# Patient Record
Sex: MALE | Race: BLACK OR AFRICAN AMERICAN | Employment: FULL TIME | URBAN - METROPOLITAN AREA
[De-identification: names, ages, dates, MRNs, and addresses within clinical notes are randomized per-mention and may not be internally consistent; named-entity substitution may affect disease eponyms.]

---

## 2020-02-09 ENCOUNTER — HOSPITAL ENCOUNTER (INPATIENT)
Age: 50
LOS: 1 days | Discharge: HOME | DRG: 101 | End: 2020-02-10
Attending: EMERGENCY MEDICINE | Admitting: FAMILY MEDICINE
Payer: COMMERCIAL

## 2020-02-09 ENCOUNTER — APPOINTMENT (OUTPATIENT)
Dept: GENERAL RADIOLOGY | Age: 50
DRG: 101 | End: 2020-02-09
Payer: COMMERCIAL

## 2020-02-09 ENCOUNTER — APPOINTMENT (OUTPATIENT)
Dept: CT IMAGING | Age: 50
DRG: 101 | End: 2020-02-09
Payer: COMMERCIAL

## 2020-02-09 PROBLEM — R56.9 SEIZURE (HCC): Status: ACTIVE | Noted: 2020-02-09

## 2020-02-09 LAB
ANION GAP SERPL CALCULATED.3IONS-SCNC: 23 MMOL/L (ref 3–16)
BASOPHILS ABSOLUTE: 0 K/UL (ref 0–0.2)
BASOPHILS RELATIVE PERCENT: 0.5 %
BILIRUBIN URINE: NEGATIVE
BLOOD, URINE: ABNORMAL
BUN BLDV-MCNC: 14 MG/DL (ref 7–20)
CALCIUM SERPL-MCNC: 9.4 MG/DL (ref 8.3–10.6)
CHLORIDE BLD-SCNC: 100 MMOL/L (ref 99–110)
CLARITY: CLEAR
CO2: 17 MMOL/L (ref 21–32)
COLOR: YELLOW
CREAT SERPL-MCNC: 1.1 MG/DL (ref 0.9–1.3)
EKG ATRIAL RATE: 140 BPM
EKG DIAGNOSIS: NORMAL
EKG P AXIS: 62 DEGREES
EKG P-R INTERVAL: 124 MS
EKG Q-T INTERVAL: 278 MS
EKG QRS DURATION: 76 MS
EKG QTC CALCULATION (BAZETT): 424 MS
EKG R AXIS: 79 DEGREES
EKG T AXIS: 23 DEGREES
EKG VENTRICULAR RATE: 140 BPM
EOSINOPHILS ABSOLUTE: 0.2 K/UL (ref 0–0.6)
EOSINOPHILS RELATIVE PERCENT: 1.7 %
EPITHELIAL CELLS, UA: 2 /HPF (ref 0–5)
GFR AFRICAN AMERICAN: >60
GFR NON-AFRICAN AMERICAN: >60
GLUCOSE BLD-MCNC: 107 MG/DL (ref 70–99)
GLUCOSE URINE: NEGATIVE MG/DL
HCT VFR BLD CALC: 45.4 % (ref 40.5–52.5)
HEMOGLOBIN: 15.5 G/DL (ref 13.5–17.5)
HYALINE CASTS: 1 /LPF (ref 0–8)
KETONES, URINE: NEGATIVE MG/DL
LACTIC ACID: 1 MMOL/L (ref 0.4–2)
LACTIC ACID: 2.8 MMOL/L (ref 0.4–2)
LACTIC ACID: 9.2 MMOL/L (ref 0.4–2)
LEUKOCYTE ESTERASE, URINE: NEGATIVE
LYMPHOCYTES ABSOLUTE: 1.5 K/UL (ref 1–5.1)
LYMPHOCYTES RELATIVE PERCENT: 15.1 %
MCH RBC QN AUTO: 29.3 PG (ref 26–34)
MCHC RBC AUTO-ENTMCNC: 34.3 G/DL (ref 31–36)
MCV RBC AUTO: 85.6 FL (ref 80–100)
MICROSCOPIC EXAMINATION: YES
MONOCYTES ABSOLUTE: 0.9 K/UL (ref 0–1.3)
MONOCYTES RELATIVE PERCENT: 9.4 %
NEUTROPHILS ABSOLUTE: 7.2 K/UL (ref 1.7–7.7)
NEUTROPHILS RELATIVE PERCENT: 73.3 %
NITRITE, URINE: NEGATIVE
PDW BLD-RTO: 14.5 % (ref 12.4–15.4)
PH UA: 5 (ref 5–8)
PHENYTOIN DOSE AMOUNT: ABNORMAL
PHENYTOIN LEVEL: 3.5 UG/ML (ref 10–20)
PLATELET # BLD: 297 K/UL (ref 135–450)
PMV BLD AUTO: 6.8 FL (ref 5–10.5)
POTASSIUM SERPL-SCNC: 4.3 MMOL/L (ref 3.5–5.1)
PRO-BNP: 10 PG/ML (ref 0–124)
PROCALCITONIN: 0.1 NG/ML (ref 0–0.15)
PROTEIN UA: 30 MG/DL
RBC # BLD: 5.3 M/UL (ref 4.2–5.9)
RBC UA: 0 /HPF (ref 0–4)
SODIUM BLD-SCNC: 140 MMOL/L (ref 136–145)
SPECIFIC GRAVITY UA: 1.01 (ref 1–1.03)
TOTAL CK: 161 U/L (ref 39–308)
TROPONIN: <0.01 NG/ML
URINE REFLEX TO CULTURE: ABNORMAL
URINE TYPE: ABNORMAL
UROBILINOGEN, URINE: 0.2 E.U./DL
WBC # BLD: 9.8 K/UL (ref 4–11)
WBC UA: 1 /HPF (ref 0–5)

## 2020-02-09 PROCEDURE — 84145 PROCALCITONIN (PCT): CPT

## 2020-02-09 PROCEDURE — 6370000000 HC RX 637 (ALT 250 FOR IP): Performed by: PHYSICIAN ASSISTANT

## 2020-02-09 PROCEDURE — 82550 ASSAY OF CK (CPK): CPT

## 2020-02-09 PROCEDURE — 36415 COLL VENOUS BLD VENIPUNCTURE: CPT

## 2020-02-09 PROCEDURE — 6360000004 HC RX CONTRAST MEDICATION: Performed by: EMERGENCY MEDICINE

## 2020-02-09 PROCEDURE — 71045 X-RAY EXAM CHEST 1 VIEW: CPT

## 2020-02-09 PROCEDURE — 83880 ASSAY OF NATRIURETIC PEPTIDE: CPT

## 2020-02-09 PROCEDURE — 80048 BASIC METABOLIC PNL TOTAL CA: CPT

## 2020-02-09 PROCEDURE — 6370000000 HC RX 637 (ALT 250 FOR IP): Performed by: FAMILY MEDICINE

## 2020-02-09 PROCEDURE — 80185 ASSAY OF PHENYTOIN TOTAL: CPT

## 2020-02-09 PROCEDURE — 85025 COMPLETE CBC W/AUTO DIFF WBC: CPT

## 2020-02-09 PROCEDURE — 83605 ASSAY OF LACTIC ACID: CPT

## 2020-02-09 PROCEDURE — 81001 URINALYSIS AUTO W/SCOPE: CPT

## 2020-02-09 PROCEDURE — 87040 BLOOD CULTURE FOR BACTERIA: CPT

## 2020-02-09 PROCEDURE — 99285 EMERGENCY DEPT VISIT HI MDM: CPT

## 2020-02-09 PROCEDURE — 94760 N-INVAS EAR/PLS OXIMETRY 1: CPT

## 2020-02-09 PROCEDURE — 96365 THER/PROPH/DIAG IV INF INIT: CPT

## 2020-02-09 PROCEDURE — 93005 ELECTROCARDIOGRAM TRACING: CPT | Performed by: PHYSICIAN ASSISTANT

## 2020-02-09 PROCEDURE — 71260 CT THORAX DX C+: CPT

## 2020-02-09 PROCEDURE — 2580000003 HC RX 258: Performed by: PHYSICIAN ASSISTANT

## 2020-02-09 PROCEDURE — 93010 ELECTROCARDIOGRAM REPORT: CPT | Performed by: INTERNAL MEDICINE

## 2020-02-09 PROCEDURE — 1200000000 HC SEMI PRIVATE

## 2020-02-09 PROCEDURE — 6360000002 HC RX W HCPCS: Performed by: PHYSICIAN ASSISTANT

## 2020-02-09 PROCEDURE — 96361 HYDRATE IV INFUSION ADD-ON: CPT

## 2020-02-09 PROCEDURE — 96367 TX/PROPH/DG ADDL SEQ IV INF: CPT

## 2020-02-09 PROCEDURE — 84484 ASSAY OF TROPONIN QUANT: CPT

## 2020-02-09 RX ORDER — PHENYTOIN SODIUM 100 MG/1
500 CAPSULE, EXTENDED RELEASE ORAL DAILY
Status: DISCONTINUED | OUTPATIENT
Start: 2020-02-09 | End: 2020-02-09

## 2020-02-09 RX ORDER — GUAIFENESIN 600 MG/1
600 TABLET, EXTENDED RELEASE ORAL 2 TIMES DAILY
Status: DISCONTINUED | OUTPATIENT
Start: 2020-02-09 | End: 2020-02-10 | Stop reason: HOSPADM

## 2020-02-09 RX ORDER — AMLODIPINE BESYLATE 5 MG/1
5 TABLET ORAL NIGHTLY
Status: DISCONTINUED | OUTPATIENT
Start: 2020-02-09 | End: 2020-02-10 | Stop reason: HOSPADM

## 2020-02-09 RX ORDER — PHENYTOIN SODIUM 100 MG/1
200 CAPSULE, EXTENDED RELEASE ORAL NIGHTLY
Status: DISCONTINUED | OUTPATIENT
Start: 2020-02-09 | End: 2020-02-10 | Stop reason: HOSPADM

## 2020-02-09 RX ORDER — IBUPROFEN 600 MG/1
600 TABLET ORAL EVERY 6 HOURS PRN
Status: DISCONTINUED | OUTPATIENT
Start: 2020-02-09 | End: 2020-02-10 | Stop reason: HOSPADM

## 2020-02-09 RX ORDER — PHENYTOIN SODIUM 100 MG/1
300 CAPSULE, EXTENDED RELEASE ORAL EVERY MORNING
Status: DISCONTINUED | OUTPATIENT
Start: 2020-02-10 | End: 2020-02-09

## 2020-02-09 RX ORDER — LORAZEPAM 2 MG/ML
2 INJECTION INTRAMUSCULAR EVERY 4 HOURS PRN
Status: DISCONTINUED | OUTPATIENT
Start: 2020-02-09 | End: 2020-02-10 | Stop reason: HOSPADM

## 2020-02-09 RX ORDER — ATORVASTATIN CALCIUM 20 MG/1
20 TABLET, FILM COATED ORAL NIGHTLY
Status: DISCONTINUED | OUTPATIENT
Start: 2020-02-09 | End: 2020-02-10 | Stop reason: HOSPADM

## 2020-02-09 RX ORDER — PHENYTOIN SODIUM 100 MG/1
100 CAPSULE, EXTENDED RELEASE ORAL ONCE
Status: COMPLETED | OUTPATIENT
Start: 2020-02-09 | End: 2020-02-09

## 2020-02-09 RX ORDER — PHENYTOIN SODIUM 100 MG/1
300 CAPSULE, EXTENDED RELEASE ORAL
Status: DISCONTINUED | OUTPATIENT
Start: 2020-02-10 | End: 2020-02-10 | Stop reason: HOSPADM

## 2020-02-09 RX ORDER — BENZONATATE 100 MG/1
100 CAPSULE ORAL 3 TIMES DAILY PRN
Status: DISCONTINUED | OUTPATIENT
Start: 2020-02-09 | End: 2020-02-10 | Stop reason: HOSPADM

## 2020-02-09 RX ORDER — AMLODIPINE BESYLATE 5 MG/1
5 TABLET ORAL DAILY
Status: DISCONTINUED | OUTPATIENT
Start: 2020-02-09 | End: 2020-02-09

## 2020-02-09 RX ORDER — 0.9 % SODIUM CHLORIDE 0.9 %
30 INTRAVENOUS SOLUTION INTRAVENOUS ONCE
Status: COMPLETED | OUTPATIENT
Start: 2020-02-09 | End: 2020-02-09

## 2020-02-09 RX ADMIN — PHENYTOIN SODIUM 100 MG: 100 CAPSULE ORAL at 12:19

## 2020-02-09 RX ADMIN — AMLODIPINE BESYLATE 5 MG: 5 TABLET ORAL at 20:34

## 2020-02-09 RX ADMIN — PHENYTOIN SODIUM 200 MG: 100 CAPSULE ORAL at 20:34

## 2020-02-09 RX ADMIN — GUAIFENESIN 600 MG: 600 TABLET ORAL at 20:34

## 2020-02-09 RX ADMIN — CEFTRIAXONE 1 G: 1 INJECTION, POWDER, FOR SOLUTION INTRAMUSCULAR; INTRAVENOUS at 12:15

## 2020-02-09 RX ADMIN — ATORVASTATIN CALCIUM 20 MG: 20 TABLET, FILM COATED ORAL at 20:34

## 2020-02-09 RX ADMIN — SODIUM CHLORIDE 2733 ML: 9 INJECTION, SOLUTION INTRAVENOUS at 10:58

## 2020-02-09 RX ADMIN — IOPAMIDOL 75 ML: 755 INJECTION, SOLUTION INTRAVENOUS at 11:49

## 2020-02-09 RX ADMIN — AZITHROMYCIN MONOHYDRATE 500 MG: 500 INJECTION, POWDER, LYOPHILIZED, FOR SOLUTION INTRAVENOUS at 12:47

## 2020-02-09 ASSESSMENT — PAIN SCALES - GENERAL
PAINLEVEL_OUTOF10: 0

## 2020-02-09 ASSESSMENT — ENCOUNTER SYMPTOMS
SORE THROAT: 0
BACK PAIN: 0
VOMITING: 0
NAUSEA: 0
ABDOMINAL PAIN: 0
SHORTNESS OF BREATH: 0
COUGH: 1

## 2020-02-09 NOTE — ED NOTES
Report called to  Edin Hedrick RN   To Room   4257   Cardiac monitor on during transfer  Pt's pain level   Denies   VSS, Afebrile   IV site is clean, dry and intact, Normal saline infusing   Family updated on transfer            Miguel Guzmán RN  02/09/20 2911

## 2020-02-09 NOTE — ED PROVIDER NOTES
Review of Systems   Constitutional: Negative for chills and fever. HENT: Negative for sore throat. Respiratory: Positive for cough. Negative for shortness of breath. Cardiovascular: Negative for chest pain, palpitations and leg swelling. Gastrointestinal: Negative for abdominal pain, nausea and vomiting. Genitourinary: Negative for difficulty urinating and dysuria. Musculoskeletal: Negative for back pain. Skin: Negative for rash. Neurological: Positive for seizures (per girlfriend). Negative for light-headedness and headaches. Psychiatric/Behavioral: The patient is not nervous/anxious. All other systems reviewed and are negative. Except as noted above the remainder ofthe review of systems was reviewed and negative. PAST MEDICALHISTORY         Diagnosis Date    Hyperlipidemia     Seizures ([facility] [address]        SURGICAL HISTORY     History reviewed. No pertinent surgical history. CURRENT MEDICATIONS       Previous Medications    ATORVASTATIN (LIPITOR) 20 MG TABLET    Take 20 mg by mouth daily    IBUPROFEN (ADVIL;MOTRIN) 600 MG TABLET    Take 1 tablet by mouth every 6 hours as needed for Pain    PHENYTOIN (DILANTIN) 100 MG ER CAPSULE    Take 500 mg by mouth daily        ALLERGIES     Patient has no known allergies. FAMILY HISTORY     History reviewed. No pertinent family history. No family status information on file. SOCIAL HISTORY    reports that he has never smoked. He has never used smokeless tobacco. He reports current alcohol use. He reports that he does not use drugs. PHYSICAL EXAM    (up to 7 for level 4, 8 or more for level 5)     ED Triage Vitals [02/09/20 1032]   Enc Vitals Group      BP (!) 156/83      Pulse 151      Resp 15      Temp 98.4 °F (36.9 °C)      Temp Source Oral      SpO2 97 %      Weight 200 lb 13.4 oz (91.1 kg)      Height 6' (1.829 m)     Physical Exam  Vitals signs and nursing note reviewed.    Constitutional:       General: He is not in acute distress. Appearance: He is well-developed. HENT:      Head: Normocephalic and atraumatic. Mouth/Throat:      Mouth: Mucous membranes are moist.   Eyes:      Pupils: Pupils are equal, round, and reactive to light. Neck:      Musculoskeletal: Neck supple. Cardiovascular:      Rate and Rhythm: Regular rhythm. Tachycardia present. Heart sounds: Normal heart sounds. Pulmonary:      Effort: Pulmonary effort is normal. No respiratory distress. Breath sounds: Normal breath sounds. Abdominal:      General: Abdomen is flat. Tenderness: There is no abdominal tenderness. Musculoskeletal: Normal range of motion. Skin:     General: Skin is warm and dry. Neurological:      Mental Status: He is alert and oriented to person, place, and time. Psychiatric:         Behavior: Behavior normal.            DIAGNOSTIC RESULTS     EK-lead EKG interpreted as sinus tachycardia with a rate of 140 bpm. No ST elevation or depression on my review. Please see Dr. Alvarez Delay note for full interpretation. When compared with EKG dated 17, ventricular rate has increased by nearly 30 bpm.    RADIOLOGY:   Non-plain film images such as CT, Ultrasound and MRI are read by the radiologist.Plain radiographic images are visualized and preliminarily interpreted by Lucretia Erickson PA-C with the below findings:        Interpretation per the Radiologist below, if available at the time of this note:    CT Chest Pulmonary Embolism W Contrast   Final Result   1. Nondiagnostic study for pulmonary embolus. 2. Right upper lobe masslike consolidation. The differential includes   atelectasis, pneumonia and neoplasm. PET scan recommended for further   evaluation. XR CHEST PORTABLE   Final Result   1. Right perihilar airspace disease and volume loss favoring pneumonia in the   proper clinical setting, an underlying mass could not be excluded. Recommend   chest radiograph in 8 weeks to confirm resolution. LABS:  Labs Reviewed   BASIC METABOLIC PANEL - Abnormal; Notable for the following components:       Result Value    CO2 17 (*)     Anion Gap 23 (*)     Glucose 107 (*)     All other components within normal limits    Narrative:     Performed at:  [facility]  [address] [address]   Phone [phone]   PHENYTOIN LEVEL, TOTAL - Abnormal; Notable for the following components:    Phenytoin Lvl 3.5 (*)     All other components within normal limits    Narrative:     Performed at:  [facility]  [address] [facility] [place] [address]   Phone [phone]   URINE RT REFLEX TO CULTURE - Abnormal; Notable for the following components:    Blood, Urine TRACE (*)     Protein, UA 30 (*)     All other components within normal limits    Narrative:     Performed at:  [facility]  [address] [facility] [address] [place] [address]   Phone [phone]   LACTIC ACID, PLASMA - Abnormal; Notable for the following components:    Lactic Acid 9.2 (*)     All other components within normal limits    Narrative:     Raghu Julian,  Chemistry results called to and read back by Kelly Ahn RN, 02/09/2020 11:38,  by [facility]  Performed at:  [facility]  [address] [facility] [place] [address]   Phone [phone]   CULTURE BLOOD #1   CULTURE BLOOD #2   CBC WITH AUTO DIFFERENTIAL    Narrative:     Performed at:  [facility]  [address] [place] [address]   Phone [phone]   TROPONIN    Narrative:     Performed at:  [facility]  [address] [address]   Phone [phone]   BRAIN NATRIURETIC PEPTIDE    Narrative:     Performed at:  [address] [place] [address]   Phone [phone]   CK    Narrative: Performed at:  [facility]  [address] [place], [place] [address]   Phone [phone]   MICROSCOPIC URINALYSIS    Narrative:     Performed at:  [facility]  [address] [place], [place] [address]   Phone [phone]   LACTIC ACID, PLASMA       All other labs were within normal range or not returned as of this dictation. EMERGENCY DEPARTMENT COURSE and DIFFERENTIAL DIAGNOSIS/MDM:   Vitals:    Vitals:    02/09/20 1145 02/09/20 1200 02/09/20 1215 02/09/20 1230   BP: 129/79 132/82 (!) 146/92 (!) 141/87   Pulse: 128 129 131 126   Resp: 24 19 21 17   Temp:       TempSrc:       SpO2: 96% 94% 97% 98%   Weight:       Height:           I saw this patient with Dr. Elvin Melton who spent face-to-face time with the patient and agreed with my assessment and plan. The patient was fairly asymptomatic on arrival however found to be tachycardic in the 140s to 150s and mildly tachypneic. He does admit to recent cough. His lungs were clear. EKG showed a sinus tachycardia. 30 cc/kg sepsis fluid bolus ordered. His heart rate is gradually improving but he is still in the 120's. He is afebrile and is not hypoxic. Patient's work-up showed no normal white blood cell count 9.8. Electrolytes within normal limits. Renal function normal.  Phenytoin level was 3.5 and he was given home dose. Troponin was less than 0.01. BNP not significantly elevated. Lactic acid is 9.2 consistent with seizure activity, repeat is pending. CK is 161. Urinalysis clear. Chest x-ray showed right perihilar airspace disease and volume loss favoring pneumonia but underlying mass cannot be excluded. He was started on empiric Rocephin and Zithromax. Given x-ray findings and persistent tachycardia, he was sent for chest CT to rule out pulmonary embolism. This was read by radiology as non diagnostic for PE, and again shows pneumonia versus mass with recommendation for PET scan.   We

## 2020-02-09 NOTE — PROGRESS NOTES
Pt admitted to room 4257 from ED. Pt alert and oriented. VSS. Seizure precautions in place. Girlfriend at bedside. Pt denies all needs at this time. Tele monitor in place. Call light in reach. Will continue to monitor.

## 2020-02-09 NOTE — H&P
results for input(s): INR in the last 72 hours. CARDIAC ENZYMES  Recent Labs     02/09/20  1053   CKTOTAL 161   TROPONINI <0.01       U/A:    Lab Results   Component Value Date    COLORU YELLOW 02/09/2020    WBCUA 1 02/09/2020    RBCUA 0 02/09/2020    CLARITYU Clear 02/09/2020    SPECGRAV 1.014 02/09/2020    LEUKOCYTESUR Negative 02/09/2020    BLOODU TRACE 02/09/2020    GLUCOSEU Negative 02/09/2020       ABG  No results found for: NNO3GHU, BEART, U3MHKXAO, PHART, THGBART, ZKF7THW, PO2ART, GDU0IUH        There are no active hospital problems to display for this patient. PHYSICIANS CERTIFICATION:    I certify that Noemí Olivares is expected to be hospitalized for more than 2 midnights based on the following assessment and plan:      ASSESSMENT/PLAN:    Abnormal ct chest   - pna vs lung mass -this could be the pna that was already treated, however the cxr isn't available in care everywhere/[facility]. Will attempt to get the records from his pcp or contact him on Monday. If this is the same pna then he could be discharged and follow up with his pcp for repeat scan as scheduled  - he has no leukocytosis or fevers, is stable on RA  - tachycardia could be due to his seizure  - will stop abx now unless he develops fevers or leukocytosis  - check blood and sputum cultures  - check procalcitonin  - mucinex   - tessalon for cough  - consult pulm    Lactis acidosis - 9.4 -> 2.8  - likely due to seizure  - ivf given  - trend    Seizures, with known seizure disorder  - due to missing a dilantin dose  -  dilantin level subtherapeutic at 3.5  - continue dilatntin  - ativan prn     DVT Prophylaxis: lovenox  Diet: No diet orders on file  Code Status: No Order    Dispo - in pt       Sommer Irizarry, DO    Thank you [phone] for the opportunity to be involved in this patient's care. If you have any questions or concerns please feel free to contact me at [phone].

## 2020-02-09 NOTE — PROGRESS NOTES
4 Eyes Skin Assessment     The patient is being assess for  Admission    I agree that 2 RN's have performed a thorough Head to Toe Skin Assessment on the patient. ALL assessment sites listed below have been assessed. Areas assessed by both nurses: yes  [x]   Head, Face, and Ears   [x]   Shoulders, Back, and Chest  [x]   Arms, Elbows, and Hands   [x]   Coccyx, Sacrum, and IschIum  [x]   Legs, Feet, and Heels        Does the Patient have Skin Breakdown?   No         Jose Prevention initiated:  NA   Wound Care Orders initiated:  NA      [facility] nurse consulted for Pressure Injury (Stage 3,4, Unstageable, DTI, NWPT, and Complex wounds), New and Established Ostomies:  NA      Nurse 1 eSignature: Electronically signed by Miriam Archuleta RN on 2/9/20 at 6:58 PM    **SHARE this note so that the co-signing nurse is able to place an eSignature**    Nurse 2 eSignature: Electronically signed by Jaime Perales RN on 2/9/20 at 6:58 PM

## 2020-02-09 NOTE — ED NOTES
Report given to receiving SOFIYA Russell, all questions answered. Pain assessment completed as documented.      Kristopher Villarreal RN  02/09/20 6356

## 2020-02-09 NOTE — ED NOTES
Bed: A15  Expected date: 2/9/20  Expected time: 10:08 AM  Means of arrival: [facility]  Comments:  49M seizure, postictal, BG-114     Amada Gerard RN  02/09/20 1023 Last office visit: 5/22/2018  Last CMP:10/16/2017  Last B/P taken 5/22/2018:  104/50    Last refill for Metoprolol Succinate ER:  3/19/2018  Last refill for Lisinopril HCL: 3/5/2018    Pending Prescriptions Disp Refills    METOPROLOL SUCCINATE  MG Or

## 2020-02-10 VITALS
SYSTOLIC BLOOD PRESSURE: 148 MMHG | HEART RATE: 109 BPM | WEIGHT: 199.96 LBS | OXYGEN SATURATION: 94 % | TEMPERATURE: 98 F | RESPIRATION RATE: 18 BRPM | HEIGHT: 72 IN | DIASTOLIC BLOOD PRESSURE: 82 MMHG | BODY MASS INDEX: 27.08 KG/M2

## 2020-02-10 LAB
ANION GAP SERPL CALCULATED.3IONS-SCNC: 13 MMOL/L (ref 3–16)
BASOPHILS ABSOLUTE: 0 K/UL (ref 0–0.2)
BASOPHILS RELATIVE PERCENT: 0.2 %
BUN BLDV-MCNC: 9 MG/DL (ref 7–20)
CALCIUM SERPL-MCNC: 8.7 MG/DL (ref 8.3–10.6)
CHLORIDE BLD-SCNC: 108 MMOL/L (ref 99–110)
CO2: 24 MMOL/L (ref 21–32)
CREAT SERPL-MCNC: 0.7 MG/DL (ref 0.9–1.3)
EOSINOPHILS ABSOLUTE: 0.1 K/UL (ref 0–0.6)
EOSINOPHILS RELATIVE PERCENT: 1 %
GFR AFRICAN AMERICAN: >60
GFR NON-AFRICAN AMERICAN: >60
GLUCOSE BLD-MCNC: 108 MG/DL (ref 70–99)
HCT VFR BLD CALC: 41.8 % (ref 40.5–52.5)
HEMOGLOBIN: 14.3 G/DL (ref 13.5–17.5)
LYMPHOCYTES ABSOLUTE: 1.2 K/UL (ref 1–5.1)
LYMPHOCYTES RELATIVE PERCENT: 13.7 %
MCH RBC QN AUTO: 29.2 PG (ref 26–34)
MCHC RBC AUTO-ENTMCNC: 34.2 G/DL (ref 31–36)
MCV RBC AUTO: 85.1 FL (ref 80–100)
MONOCYTES ABSOLUTE: 0.8 K/UL (ref 0–1.3)
MONOCYTES RELATIVE PERCENT: 8.9 %
NEUTROPHILS ABSOLUTE: 6.9 K/UL (ref 1.7–7.7)
NEUTROPHILS RELATIVE PERCENT: 76.2 %
PDW BLD-RTO: 14.3 % (ref 12.4–15.4)
PLATELET # BLD: 239 K/UL (ref 135–450)
PMV BLD AUTO: 7 FL (ref 5–10.5)
POTASSIUM REFLEX MAGNESIUM: 4 MMOL/L (ref 3.5–5.1)
RBC # BLD: 4.91 M/UL (ref 4.2–5.9)
SODIUM BLD-SCNC: 145 MMOL/L (ref 136–145)
WBC # BLD: 9 K/UL (ref 4–11)

## 2020-02-10 PROCEDURE — 36415 COLL VENOUS BLD VENIPUNCTURE: CPT

## 2020-02-10 PROCEDURE — 85025 COMPLETE CBC W/AUTO DIFF WBC: CPT

## 2020-02-10 PROCEDURE — 80048 BASIC METABOLIC PNL TOTAL CA: CPT

## 2020-02-10 PROCEDURE — 6370000000 HC RX 637 (ALT 250 FOR IP): Performed by: FAMILY MEDICINE

## 2020-02-10 PROCEDURE — 99255 IP/OBS CONSLTJ NEW/EST HI 80: CPT | Performed by: INTERNAL MEDICINE

## 2020-02-10 RX ORDER — AMLODIPINE BESYLATE 5 MG/1
5 TABLET ORAL DAILY
COMMUNITY

## 2020-02-10 RX ADMIN — GUAIFENESIN 600 MG: 600 TABLET ORAL at 09:16

## 2020-02-10 RX ADMIN — PHENYTOIN SODIUM 300 MG: 100 CAPSULE ORAL at 05:09

## 2020-02-10 ASSESSMENT — PAIN SCALES - GENERAL: PAINLEVEL_OUTOF10: 0

## 2020-02-10 NOTE — CONSULTS
anatomical disruption    PLAN:  · Needs bronchoscopy (inpatient of outpatient) but wants to think about it. I think he has a broncholith and it is obstructing his RUL anterior segment. There is calcific burden in the right hilum which could also be causing extrinsic compression of the airway. · No antibiotics needed     He can go home and schedule this as outpatient. Could have been there for years    I gave him my card.   He will call        Eryn Ramos, DO  [facility]

## 2020-02-10 NOTE — CARE COORDINATION
Met briefly w/ this pt  He confirmed address  Confirmed  That he is independent  W/ ADL's and does drive.   PCP is Dinora Maxwell  Has no health coverage listed but states that he has coverage and has no concerns getting medications  He drives  Denied dc needs   Electronically signed by Marysol Silverman RN on 2/10/2020 at 10:40 AM

## 2020-02-10 NOTE — DISCHARGE SUMMARY
Hospital Medicine Discharge Summary    Patient ID: [place]ruff      Patient's PCP: Alexa Colbert    Admit Date: 2/9/2020     Discharge Date: 2/10/2020      Admitting Physician: Beckie Driscoll DO     Discharge Physician: Natalia Fragoso MD     Discharge Diagnoses: Active Hospital Problems    Diagnosis    Abnormal CT of the chest [R93.89]    Broncholithiasis [J98.09]    Cough [R05]    Seizure ([address] [R56.9]       The patient was seen and examined on day of discharge and this discharge summary is in conjunction with any daily progress note from day of discharge. Hospital Course: 50yo man with hx of idiopathic seizure disorder diagnosed over 10 years ago - controlled with dilantin, presented with 2 min tonic clonic seizure at home after he missed several doses of his dilantin. Accidentally found with RUL mass like consolidation. No prior Hx. RUL Mass-like consolidation. No signs of active infection. Eval by pulm - plan for OP bronchoscopy with biopsies - discussed with pt and his girlfriend at length        Break Through Seizure - due to missing dilantin. Med restarted   No repeat seizures. No focal neurological symptoms. Lactic Acidosis - due to seizure episode - resolved with IVF. Physical Exam Performed:     BP (!) 148/82   Pulse 109   Temp 98 °F (36.7 °C) (Oral)   Resp 18   Ht 6' (1.829 m)   Wt 199 lb 15.3 oz (90.7 kg)   SpO2 94%   BMI 27.12 kg/m²       General appearance:  No apparent distress, appears stated age and cooperative. HEENT:  Normal cephalic, atraumatic without obvious deformity. Pupils equal, round, and reactive to light. Extra ocular muscles intact. Conjunctivae/corneas clear. Neck: Supple, with full range of motion. No jugular venous distention. Trachea midline. Respiratory:  Normal respiratory effort. Clear to auscultation, bilaterally without Rales/Wheezes/Rhonchi.   Cardiovascular:  Regular rate and rhythm with normal S1/S2 without murmurs, rubs or gallops. Abdomen: Soft, non-tender, non-distended with normal bowel sounds. Musculoskeletal:  No clubbing, cyanosis or edema bilaterally. Full range of motion without deformity. Skin: Skin color, texture, turgor normal.  No rashes or lesions. Neurologic:  Neurovascularly intact without any focal sensory/motor deficits. Cranial nerves: II-XII intact, grossly non-focal.  Psychiatric:  Alert and oriented, thought content appropriate, normal insight  Capillary Refill: Brisk,< 3 seconds   Peripheral Pulses: +2 palpable, equal bilaterally       Labs: For convenience and continuity at follow-up the following most recent labs are provided:      CBC:    Lab Results   Component Value Date    WBC 9.0 02/10/2020    HGB 14.3 02/10/2020    HCT 41.8 02/10/2020     02/10/2020       Renal:    Lab Results   Component Value Date     02/10/2020    K 4.0 02/10/2020     02/10/2020    CO2 24 02/10/2020    BUN 9 02/10/2020    CREATININE 0.7 02/10/2020    CALCIUM 8.7 02/10/2020         Significant Diagnostic Studies    Radiology:   CT Chest Pulmonary Embolism W Contrast   Final Result   1. Nondiagnostic study for pulmonary embolus. 2. Right upper lobe masslike consolidation. The differential includes   atelectasis, pneumonia and neoplasm. PET scan recommended for further   evaluation. XR CHEST PORTABLE   Final Result   1. Right perihilar airspace disease and volume loss favoring pneumonia in the   proper clinical setting, an underlying mass could not be excluded. Recommend   chest radiograph in 8 weeks to confirm resolution. Consults:     IP CONSULT TO HOSPITALIST  IP CONSULT TO PULMONOLOGY    Disposition:  home    Condition at Discharge: Stable    Discharge Instructions/Follow-up:  Pulm 1 week. For bronchoscopy.      Code Status:  Full Code     Activity: activity as tolerated    Diet: regular diet      Discharge Medications:     Discharge Medication List as

## 2020-02-13 LAB
BLOOD CULTURE, ROUTINE: NORMAL
CULTURE, BLOOD 2: NORMAL

## 2021-06-01 ENCOUNTER — WEB ENCOUNTER (OUTPATIENT)
Dept: URBAN - METROPOLITAN AREA CLINIC 84 | Facility: CLINIC | Age: 51
End: 2021-06-01

## 2021-06-01 ENCOUNTER — DASHBOARD ENCOUNTERS (OUTPATIENT)
Age: 51
End: 2021-06-01

## 2021-06-01 ENCOUNTER — OFFICE VISIT (OUTPATIENT)
Dept: URBAN - METROPOLITAN AREA CLINIC 84 | Facility: CLINIC | Age: 51
End: 2021-06-01
Payer: COMMERCIAL

## 2021-06-01 VITALS
BODY MASS INDEX: 33.27 KG/M2 | HEIGHT: 73 IN | RESPIRATION RATE: 16 BRPM | WEIGHT: 251 LBS | TEMPERATURE: 97 F | SYSTOLIC BLOOD PRESSURE: 160 MMHG | HEART RATE: 64 BPM | DIASTOLIC BLOOD PRESSURE: 94 MMHG

## 2021-06-01 DIAGNOSIS — Z12.11 COLON CANCER SCREENING: ICD-10-CM

## 2021-06-01 DIAGNOSIS — E66.9 OBESITY (BMI 30-39.9): ICD-10-CM

## 2021-06-01 DIAGNOSIS — I10 HYPERTENSION, UNSPECIFIED TYPE: ICD-10-CM

## 2021-06-01 PROBLEM — 162864005: Status: ACTIVE | Noted: 2021-06-01

## 2021-06-01 PROBLEM — 38341003: Status: ACTIVE | Noted: 2021-06-01

## 2021-06-01 PROCEDURE — 99202 OFFICE O/P NEW SF 15 MIN: CPT | Performed by: INTERNAL MEDICINE

## 2021-06-01 RX ORDER — UBIDECARENONE/VIT E ACET 100MG-5
1 CAPSULE CAPSULE ORAL ONCE A DAY
Status: ACTIVE | COMMUNITY

## 2021-06-01 RX ORDER — AMLODIPINE BESYLATE 10 MG/1
1 TABLET TABLET ORAL ONCE A DAY
Status: ACTIVE | COMMUNITY

## 2021-06-15 ENCOUNTER — OFFICE VISIT (OUTPATIENT)
Dept: URBAN - METROPOLITAN AREA SURGERY CENTER 20 | Facility: SURGERY CENTER | Age: 51
End: 2021-06-15
Payer: COMMERCIAL

## 2021-06-15 DIAGNOSIS — Z12.11 COLON CANCER SCREENING: ICD-10-CM

## 2021-06-15 PROCEDURE — G8907 PT DOC NO EVENTS ON DISCHARG: HCPCS | Performed by: INTERNAL MEDICINE

## 2021-06-15 PROCEDURE — 45378 DIAGNOSTIC COLONOSCOPY: CPT | Performed by: INTERNAL MEDICINE

## 2021-06-15 RX ORDER — AMLODIPINE BESYLATE 10 MG/1
1 TABLET TABLET ORAL ONCE A DAY
Status: ACTIVE | COMMUNITY

## 2021-06-15 RX ORDER — UBIDECARENONE/VIT E ACET 100MG-5
1 CAPSULE CAPSULE ORAL ONCE A DAY
Status: ACTIVE | COMMUNITY